# Patient Record
Sex: FEMALE | Race: WHITE | HISPANIC OR LATINO | Employment: OTHER | URBAN - METROPOLITAN AREA
[De-identification: names, ages, dates, MRNs, and addresses within clinical notes are randomized per-mention and may not be internally consistent; named-entity substitution may affect disease eponyms.]

---

## 2023-10-03 ENCOUNTER — OFFICE VISIT (OUTPATIENT)
Dept: URGENT CARE | Facility: CLINIC | Age: 38
End: 2023-10-03
Payer: MEDICAID

## 2023-10-03 VITALS
TEMPERATURE: 97.8 F | SYSTOLIC BLOOD PRESSURE: 118 MMHG | RESPIRATION RATE: 15 BRPM | DIASTOLIC BLOOD PRESSURE: 74 MMHG | HEART RATE: 67 BPM | HEIGHT: 60 IN | BODY MASS INDEX: 20.03 KG/M2 | WEIGHT: 102 LBS | OXYGEN SATURATION: 98 %

## 2023-10-03 DIAGNOSIS — L30.4 INTERTRIGO: ICD-10-CM

## 2023-10-03 PROCEDURE — 3078F DIAST BP <80 MM HG: CPT | Performed by: NURSE PRACTITIONER

## 2023-10-03 PROCEDURE — 99203 OFFICE O/P NEW LOW 30 MIN: CPT | Performed by: NURSE PRACTITIONER

## 2023-10-03 PROCEDURE — 3074F SYST BP LT 130 MM HG: CPT | Performed by: NURSE PRACTITIONER

## 2023-10-03 RX ORDER — KETOCONAZOLE 20 MG/G
1 CREAM TOPICAL DAILY
Qty: 15 G | Refills: 0 | Status: SHIPPED | OUTPATIENT
Start: 2023-10-03 | End: 2023-10-05 | Stop reason: SDUPTHER

## 2023-10-03 RX ORDER — CEPHALEXIN 500 MG/1
500 CAPSULE ORAL 3 TIMES DAILY
Qty: 15 CAPSULE | Refills: 0 | Status: SHIPPED | OUTPATIENT
Start: 2023-10-03 | End: 2023-10-08

## 2023-10-03 RX ORDER — TRIAMCINOLONE ACETONIDE 1 MG/G
1 OINTMENT TOPICAL 2 TIMES DAILY
Qty: 15 G | Refills: 0 | Status: SHIPPED | OUTPATIENT
Start: 2023-10-03 | End: 2023-10-10

## 2023-10-03 ASSESSMENT — ENCOUNTER SYMPTOMS
FEVER: 0
FATIGUE: 0
COUGH: 0
HEADACHES: 1

## 2023-10-03 NOTE — PROGRESS NOTES
Subjective:   Emilie Nicole is a 38 y.o. female who presents for Rash and Headache      Rash  This is a new problem. The problem has been gradually worsening since onset. The rash is diffuse (worse in genital area/groin). The rash is characterized by redness, pain, burning, draining and itchiness. She was exposed to nothing. Pertinent negatives include no cough, fatigue or fever. Past treatments include nothing. The treatment provided no relief.       Review of Systems   Constitutional:  Negative for fatigue and fever.   Respiratory:  Negative for cough.    Genitourinary:  Negative for dysuria and urgency.   Skin:  Positive for itching and rash.   Neurological:  Positive for headaches.       Medications:    cephALEXin Caps  ketoconazole Crea  traMADol Tabs    Allergies: Patient has no known allergies.    Problem List: Emilie Nicole does not have a problem list on file.    Surgical History:  No past surgical history on file.    Past Social Hx: Emilie Nicole  reports that she has never smoked. She does not have any smokeless tobacco history on file. She reports that she does not drink alcohol and does not use drugs.     Past Family Hx:  Emilie Nicole family history is not on file.     Problem list, medications, and allergies reviewed by myself today in Epic.     Objective:     /74 (BP Location: Right arm, Patient Position: Sitting, BP Cuff Size: Adult long)   Pulse 67   Temp 36.6 °C (97.8 °F) (Temporal)   Resp 15   Ht 1.524 m (5')   Wt 46.3 kg (102 lb)   SpO2 98%   BMI 19.92 kg/m²     Physical Exam  Constitutional:       Appearance: Normal appearance. She is not ill-appearing or toxic-appearing.   HENT:      Head: Normocephalic.      Right Ear: External ear normal.      Left Ear: External ear normal.      Nose: Nose normal.      Mouth/Throat:      Lips: Pink.   Eyes:      General: Lids are normal.   Pulmonary:      Effort: Pulmonary effort is normal. No accessory muscle usage.   Genitourinary:      Labia:         Right: Rash present.         Left: Rash present.           Comments: No vesicular lesions, no ulcerations.  Excoriated erythematous skin of the labia/rectum/groin  Musculoskeletal:      Cervical back: Full passive range of motion without pain.   Skin:     Findings: Rash present. Rash is macular and papular.          Neurological:      Mental Status: She is alert and oriented to person, place, and time.   Psychiatric:         Mood and Affect: Mood normal.         Thought Content: Thought content normal.         Assessment/Plan:     Diagnosis and associated orders:     1. Intertrigo  cephALEXin (KEFLEX) 500 MG Cap    ketoconazole (NIZORAL) 2 % Cream    triamcinolone acetonide (KENALOG) 0.1 % Ointment           Comments/MDM:     I personally reviewed prior external notes and prior test results pertinent to today's visit.  Rash does appear to be fungal and bacterial should be treated with topical and oral antibiotics.  Recommended antihistamines for pruritus  Discussed management options, risks and benefits, and alternatives to treatment plan agreed upon.   Red flags discussed and indications to immediately call 911 or present to the Emergency Department.   Supportive care, differential diagnoses, and indications for immediate follow-up discussed with patient.    Patient expresses understanding and agrees to plan. Patient denies any other questions or concerns.            Please note that this dictation was created using voice recognition software. I have made a reasonable attempt to correct obvious errors, but I expect that there are errors of grammar and possibly content that I did not discover before finalizing the note.    This note was electronically signed by Abhi MANZANO.

## 2023-10-05 ENCOUNTER — OFFICE VISIT (OUTPATIENT)
Dept: URGENT CARE | Facility: CLINIC | Age: 38
End: 2023-10-05
Payer: MEDICAID

## 2023-10-05 VITALS
TEMPERATURE: 98.6 F | DIASTOLIC BLOOD PRESSURE: 60 MMHG | WEIGHT: 111.8 LBS | OXYGEN SATURATION: 99 % | HEIGHT: 60 IN | RESPIRATION RATE: 18 BRPM | SYSTOLIC BLOOD PRESSURE: 120 MMHG | BODY MASS INDEX: 21.95 KG/M2 | HEART RATE: 55 BPM

## 2023-10-05 DIAGNOSIS — B36.9 FUNGAL RASH OF TORSO: ICD-10-CM

## 2023-10-05 PROCEDURE — 3074F SYST BP LT 130 MM HG: CPT | Performed by: FAMILY MEDICINE

## 2023-10-05 PROCEDURE — 99213 OFFICE O/P EST LOW 20 MIN: CPT | Performed by: FAMILY MEDICINE

## 2023-10-05 PROCEDURE — 3078F DIAST BP <80 MM HG: CPT | Performed by: FAMILY MEDICINE

## 2023-10-05 RX ORDER — KETOCONAZOLE 20 MG/G
1 CREAM TOPICAL DAILY
Qty: 30 G | Refills: 0 | Status: SHIPPED | OUTPATIENT
Start: 2023-10-05 | End: 2023-10-12

## 2023-10-05 RX ORDER — FLUCONAZOLE 150 MG/1
150 TABLET ORAL
Qty: 2 TABLET | Refills: 0 | Status: SHIPPED | OUTPATIENT
Start: 2023-10-05 | End: 2023-10-09

## 2023-10-05 ASSESSMENT — ENCOUNTER SYMPTOMS: FEVER: 0

## 2023-10-05 NOTE — PROGRESS NOTES
Subjective:     Emilie Nicole is a 38 y.o. female who presents for Rash (X1week stomach/chest rash/itching/red/)    HPI  Pt presents for follow-up of a worsening problem   Pt with rash for about a week   Has rash on the chest/abdomen and buttock area   Rash is very pruritic and red   Pt seen in office 2 days ago for a rash which was diagnosed as intertrigo   Treated with PO Keflex as well as topical triamcinolone and ketoconazole  Rash on the buttock area has improved quite a bit, but the rash on torso has not   No new areas of rash     Review of Systems   Constitutional:  Negative for fever.   Skin:  Positive for itching and rash.     PMH:  has no past medical history on file.  MEDS:   Current Outpatient Medications:     fluconazole (DIFLUCAN) 150 MG tablet, Take 1 Tablet by mouth every 72 hours for 2 doses., Disp: 2 Tablet, Rfl: 0    ketoconazole (NIZORAL) 2 % Cream, Apply 1 Application topically every day for 7 days., Disp: 30 g, Rfl: 0    cephALEXin (KEFLEX) 500 MG Cap, Take 1 Capsule by mouth 3 times a day for 5 days., Disp: 15 Capsule, Rfl: 0    triamcinolone acetonide (KENALOG) 0.1 % Ointment, Apply 1 Application topically 2 times a day for 7 days., Disp: 15 g, Rfl: 0  ALLERGIES: No Known Allergies  SURGHX: History reviewed. No pertinent surgical history.  SOCHX:  reports that she has never smoked. She does not have any smokeless tobacco history on file. She reports that she does not drink alcohol and does not use drugs.     Objective:   /60 (BP Location: Left arm, Patient Position: Sitting)   Pulse (!) 55   Temp 37 °C (98.6 °F) (Temporal)   Resp 18   Ht 1.524 m (5')   Wt 50.7 kg (111 lb 12.8 oz)   LMP  (Exact Date)   SpO2 99%   BMI 21.83 kg/m²     Physical Exam  Constitutional:       General: She is not in acute distress.     Appearance: She is well-developed. She is not diaphoretic.   Pulmonary:      Effort: Pulmonary effort is normal.   Neurological:      Mental Status: She is alert.   Many  small lightly erythematous areas throughout the torso which are flaky, nontender, and with no connecting erythema between.  There are no open lesions or bleeding    Assessment/Plan:   Assessment    1. Fungal rash of torso  - fluconazole (DIFLUCAN) 150 MG tablet; Take 1 Tablet by mouth every 72 hours for 2 doses.  Dispense: 2 Tablet; Refill: 0  - ketoconazole (NIZORAL) 2 % Cream; Apply 1 Application topically every day for 7 days.  Dispense: 30 g; Refill: 0    Patient with fungal rash of the torso.  Was previously only using the triamcinolone ointment on this area and was not using the ketoconazole cream.  Advised that she should start using the ketoconazole cream in this area.  Have also prescribed Diflucan to be taken twice.  Follow-up if not resolving.

## 2024-03-25 ENCOUNTER — HOSPITAL ENCOUNTER (OUTPATIENT)
Dept: RADIOLOGY | Facility: MEDICAL CENTER | Age: 39
End: 2024-03-25
Attending: NURSE PRACTITIONER
Payer: MEDICAID

## 2024-03-25 DIAGNOSIS — K21.9 GASTROESOPHAGEAL REFLUX DISEASE, UNSPECIFIED WHETHER ESOPHAGITIS PRESENT: ICD-10-CM

## 2024-03-25 DIAGNOSIS — R10.13 DYSPEPSIA: ICD-10-CM

## 2024-03-25 PROCEDURE — 700117 HCHG RX CONTRAST REV CODE 255: Performed by: NURSE PRACTITIONER

## 2024-03-25 PROCEDURE — 74220 X-RAY XM ESOPHAGUS 1CNTRST: CPT

## 2024-03-25 RX ADMIN — BARIUM SULFATE 700 MG: 700 TABLET ORAL at 10:08

## 2024-10-10 ENCOUNTER — HOSPITAL ENCOUNTER (OUTPATIENT)
Dept: RADIOLOGY | Facility: MEDICAL CENTER | Age: 39
End: 2024-10-10
Attending: NURSE PRACTITIONER
Payer: MEDICAID

## 2024-11-05 ENCOUNTER — HOSPITAL ENCOUNTER (OUTPATIENT)
Dept: RADIOLOGY | Facility: MEDICAL CENTER | Age: 39
End: 2024-11-05
Attending: NURSE PRACTITIONER
Payer: MEDICAID

## 2024-11-05 DIAGNOSIS — R79.89 ABNORMAL LFTS: ICD-10-CM

## 2024-11-05 PROCEDURE — 76705 ECHO EXAM OF ABDOMEN: CPT
